# Patient Record
Sex: FEMALE | Race: BLACK OR AFRICAN AMERICAN | Employment: PART TIME | URBAN - METROPOLITAN AREA
[De-identification: names, ages, dates, MRNs, and addresses within clinical notes are randomized per-mention and may not be internally consistent; named-entity substitution may affect disease eponyms.]

---

## 2017-08-02 ENCOUNTER — HOSPITAL ENCOUNTER (EMERGENCY)
Age: 27
Discharge: HOME | End: 2017-08-03
Attending: INTERNAL MEDICINE
Payer: SELF-PAY

## 2017-08-02 VITALS
TEMPERATURE: 98.4 F | HEIGHT: 62 IN | WEIGHT: 155 LBS | SYSTOLIC BLOOD PRESSURE: 131 MMHG | RESPIRATION RATE: 20 BRPM | HEART RATE: 89 BPM | OXYGEN SATURATION: 100 % | BODY MASS INDEX: 28.52 KG/M2 | DIASTOLIC BLOOD PRESSURE: 98 MMHG

## 2017-08-02 DIAGNOSIS — K29.50 CHRONIC GASTRITIS WITHOUT BLEEDING, UNSPECIFIED GASTRITIS TYPE: Primary | ICD-10-CM

## 2017-08-02 DIAGNOSIS — R11.2 NON-INTRACTABLE VOMITING WITH NAUSEA, UNSPECIFIED VOMITING TYPE: ICD-10-CM

## 2017-08-02 LAB
ANION GAP BLD CALC-SCNC: 13 MMOL/L (ref 3–18)
APPEARANCE UR: ABNORMAL
BACTERIA URNS QL MICRO: ABNORMAL /HPF
BASOPHILS # BLD AUTO: 0 K/UL (ref 0–0.06)
BASOPHILS # BLD: 0 % (ref 0–2)
BILIRUB UR QL: NEGATIVE
BUN SERPL-MCNC: 14 MG/DL (ref 7–18)
BUN/CREAT SERPL: 15 (ref 12–20)
CALCIUM SERPL-MCNC: 8.8 MG/DL (ref 8.5–10.1)
CHLORIDE SERPL-SCNC: 106 MMOL/L (ref 100–108)
CO2 SERPL-SCNC: 25 MMOL/L (ref 21–32)
COLOR UR: YELLOW
CREAT SERPL-MCNC: 0.91 MG/DL (ref 0.6–1.3)
DIFFERENTIAL METHOD BLD: NORMAL
EOSINOPHIL # BLD: 0 K/UL (ref 0–0.4)
EOSINOPHIL NFR BLD: 0 % (ref 0–5)
EPITH CASTS URNS QL MICRO: ABNORMAL /LPF (ref 0–5)
ERYTHROCYTE [DISTWIDTH] IN BLOOD BY AUTOMATED COUNT: 12.4 % (ref 11.6–14.5)
GLUCOSE SERPL-MCNC: 91 MG/DL (ref 74–99)
GLUCOSE UR STRIP.AUTO-MCNC: NEGATIVE MG/DL
HCG UR QL: NEGATIVE
HCT VFR BLD AUTO: 38.1 % (ref 35–45)
HGB BLD-MCNC: 13.2 G/DL (ref 12–16)
HGB UR QL STRIP: ABNORMAL
KETONES UR QL STRIP.AUTO: 15 MG/DL
LEUKOCYTE ESTERASE UR QL STRIP.AUTO: NEGATIVE
LYMPHOCYTES # BLD AUTO: 24 % (ref 21–52)
LYMPHOCYTES # BLD: 2.5 K/UL (ref 0.9–3.6)
MCH RBC QN AUTO: 29.7 PG (ref 24–34)
MCHC RBC AUTO-ENTMCNC: 34.6 G/DL (ref 31–37)
MCV RBC AUTO: 85.6 FL (ref 74–97)
MONOCYTES # BLD: 0.8 K/UL (ref 0.05–1.2)
MONOCYTES NFR BLD AUTO: 8 % (ref 3–10)
MUCOUS THREADS URNS QL MICRO: ABNORMAL /LPF
NEUTS SEG # BLD: 6.9 K/UL (ref 1.8–8)
NEUTS SEG NFR BLD AUTO: 68 % (ref 40–73)
NITRITE UR QL STRIP.AUTO: NEGATIVE
PH UR STRIP: 5.5 [PH] (ref 5–8)
PLATELET # BLD AUTO: 200 K/UL (ref 135–420)
PMV BLD AUTO: 10 FL (ref 9.2–11.8)
POTASSIUM SERPL-SCNC: 3.6 MMOL/L (ref 3.5–5.5)
PROT UR STRIP-MCNC: 30 MG/DL
RBC # BLD AUTO: 4.45 M/UL (ref 4.2–5.3)
RBC #/AREA URNS HPF: ABNORMAL /HPF (ref 0–5)
SODIUM SERPL-SCNC: 144 MMOL/L (ref 136–145)
SP GR UR REFRACTOMETRY: >1.03 (ref 1–1.03)
UROBILINOGEN UR QL STRIP.AUTO: 1 EU/DL (ref 0.2–1)
WBC # BLD AUTO: 10.3 K/UL (ref 4.6–13.2)
WBC URNS QL MICRO: ABNORMAL /HPF (ref 0–5)

## 2017-08-02 PROCEDURE — 96374 THER/PROPH/DIAG INJ IV PUSH: CPT

## 2017-08-02 PROCEDURE — 74011250636 HC RX REV CODE- 250/636: Performed by: PHYSICIAN ASSISTANT

## 2017-08-02 PROCEDURE — 80048 BASIC METABOLIC PNL TOTAL CA: CPT | Performed by: PHYSICIAN ASSISTANT

## 2017-08-02 PROCEDURE — 99283 EMERGENCY DEPT VISIT LOW MDM: CPT

## 2017-08-02 PROCEDURE — 81001 URINALYSIS AUTO W/SCOPE: CPT | Performed by: INTERNAL MEDICINE

## 2017-08-02 PROCEDURE — 81025 URINE PREGNANCY TEST: CPT

## 2017-08-02 PROCEDURE — 85025 COMPLETE CBC W/AUTO DIFF WBC: CPT | Performed by: PHYSICIAN ASSISTANT

## 2017-08-02 PROCEDURE — 96361 HYDRATE IV INFUSION ADD-ON: CPT

## 2017-08-02 RX ORDER — ONDANSETRON 2 MG/ML
4 INJECTION INTRAMUSCULAR; INTRAVENOUS
Status: COMPLETED | OUTPATIENT
Start: 2017-08-02 | End: 2017-08-02

## 2017-08-02 RX ADMIN — SODIUM CHLORIDE 1000 ML: 900 INJECTION, SOLUTION INTRAVENOUS at 23:39

## 2017-08-02 RX ADMIN — ONDANSETRON 4 MG: 2 INJECTION INTRAMUSCULAR; INTRAVENOUS at 23:39

## 2017-08-03 RX ORDER — OMEPRAZOLE 20 MG/1
20 CAPSULE, DELAYED RELEASE ORAL DAILY
Qty: 30 CAP | Refills: 0 | Status: SHIPPED | OUTPATIENT
Start: 2017-08-03 | End: 2017-09-02

## 2017-08-03 RX ORDER — ONDANSETRON 4 MG/1
4 TABLET, ORALLY DISINTEGRATING ORAL
Qty: 12 TAB | Refills: 0 | Status: SHIPPED | OUTPATIENT
Start: 2017-08-03 | End: 2019-01-16

## 2017-08-03 NOTE — ED NOTES
I have reviewed discharge instructions with the patient. The patient verbalized understanding.   Patient armband removed and shredded  Reviewed and verified discharge instructions with patient, prescriptions e-scripted to [facility] for Zofran and Prilosec, pt discharged ambulatory on steady gait in NAD

## 2017-08-03 NOTE — ED TRIAGE NOTES
Pt c/o dizziness and vomiting since 0200 yesterday morning. Sepsis Screening completed    (  )Patient meets SIRS criteria. ( X )Patient does not meet SIRS criteria.       SIRS Criteria is achieved when two or more of the following are present   Temperature < 96.8°F (36°C) or > 100.9°F (38.3°C)   Heart Rate > 90 beats per minute   Respiratory Rate > 20 breaths per minute   WBC count > 12,000 or <4,000 or > 10% bands

## 2017-08-03 NOTE — ED NOTES
Pt reports intermittent vomiting for \"a long time\", is unable to verbalize anything that makes it worse.

## 2017-08-03 NOTE — ED PROVIDER NOTES
[address]  EMERGENCY DEPARTMENT HISTORY AND PHYSICAL EXAM       Date: 8/2/2017   Patient Name: Jenniffer Marquez   YOB: 1990  Medical Record Number: [id number]    History of Presenting Illness     Chief Complaint   Patient presents with    Dizziness    Vomiting        History Provided By:  patient    Additional History: 10:17 PM   Jenniffer Marquez is a 33 y.o. female who presents to the emergency department C/O lightheadedness and dizziness starting yesterday. Associated sxs include headache, lower back pain, fatigue, and intermittent, cramping, abdominal pain. Pt reports she has chronic nausea and vomiting for \"years\"; vomiting occurs approximately 5 days a week. States that she has been evaluated for this in the past, but is only diagnosed with dehydration. Denies prior evaluation with endoscopy or colonoscopy. Denies frequent NSAID use. Pt states she now has daily BMs, which is more frequent than her typical. Reports fhx of GERD. Reports cigarette use and occasional etoh use; denies illicit drug use. Denies fever, diarrhea, hematochezia, and any other sxs or complaints. Primary Care Provider: Adelaida Mojica MD   Specialist:    Past History     Past Medical History:   History reviewed. No pertinent past medical history. Past Surgical History:   Past Surgical History:   Procedure Laterality Date    HX ORTHOPAEDIC      right foot surgery        Family History:   History reviewed. No pertinent family history. Social History:   Social History   Substance Use Topics    Smoking status: Current Every Day Smoker     Packs/day: 0.25    Smokeless tobacco: Never Used    Alcohol use Yes      Comment: occasional        Allergies:   No Known Allergies     Review of Systems   Review of Systems   Constitutional: Positive for fatigue. Negative for fever. Gastrointestinal: Positive for abdominal pain (intermittent), nausea (chornic) and vomiting (chronic).  Negative for blood in stool and diarrhea. Musculoskeletal: Positive for back pain (lower). Neurological: Positive for dizziness and light-headedness. All other systems reviewed and are negative. Physical Exam  Vitals:    08/02/17 2147   BP: (!) 131/98   Pulse: 89   Resp: 20   Temp: 98.4 °F (36.9 °C)   SpO2: 100%   Weight: 70.3 kg (155 lb)   Height: 5' 2\" (1.575 m)       Physical Exam   Nursing note and vitals reviewed. Vital signs and nursing notes reviewed. CONSTITUTIONAL: Alert. Well-appearing; well-nourished; in no apparent distress. HEAD: Normocephalic; atraumatic. EYES: PERRL; Conjunctiva clear. ENT: TM's normal. Normal nose; no rhinorrhea. Normal pharynx. Tonsils not enlarged without exudate. Slightly dry mucus membranes. NECK: Supple; FROM without difficulty, non-tender; no cervical lymphadenopathy. CV: Normal S1, S2; no murmurs, rubs, or gallops. No chest wall tenderness. RESPIRATORY: Normal chest excursion with respiration; breath sounds clear and equal bilaterally; no wheezes, rhonchi, or rales. GI: Normal bowel sounds; non-distended; non-tender; no guarding or rigidity; no palpable organomegaly. No CVA tenderness. BACK:  No evidence of trauma or deformity. Non-tender to palpation. FROM without difficulty. Negative straight leg raise bilaterally. EXT: Normal ROM in all four extremities; non-tender to palpation. SKIN: Normal for age and race; warm; dry; good turgor; no apparent lesions or exudate. NEURO: A & O x3. Cranial nerves 2-12 intact. Motor 5/5 bilaterally. Sensation intact. PSYCH:  Mood and affect appropriate.       Diagnostic Study Results     Labs -      Recent Results (from the past 12 hour(s))   URINALYSIS W/ RFLX MICROSCOPIC    Collection Time: 08/02/17  9:57 PM   Result Value Ref Range    Color YELLOW      Appearance CLOUDY      Specific gravity >1.030 (H) 1.005 - 1.030    pH (UA) 5.5 5.0 - 8.0      Protein 30 (A) NEG mg/dL    Glucose NEGATIVE  NEG mg/dL    Ketone 15 (A) NEG mg/dL    Bilirubin NEGATIVE  NEG      Blood TRACE (A) NEG      Urobilinogen 1.0 0.2 - 1.0 EU/dL    Nitrites NEGATIVE  NEG      Leukocyte Esterase NEGATIVE  NEG     URINE MICROSCOPIC ONLY    Collection Time: 08/02/17  9:57 PM   Result Value Ref Range    WBC 0 to 1 0 - 5 /hpf    RBC 1 to 3 0 - 5 /hpf    Epithelial cells 2+ 0 - 5 /lpf    Bacteria 1+ (A) NEG /hpf    Mucus 3+ (A) NEG /lpf   HCG URINE, QL. - POC    Collection Time: 08/02/17 10:27 PM   Result Value Ref Range    Pregnancy test,urine (POC) NEGATIVE  NEG     CBC WITH AUTOMATED DIFF    Collection Time: 08/02/17 11:25 PM   Result Value Ref Range    WBC 10.3 4.6 - 13.2 K/uL    RBC 4.45 4.20 - 5.30 M/uL    HGB 13.2 12.0 - 16.0 g/dL    HCT 38.1 35.0 - 45.0 %    MCV 85.6 74.0 - 97.0 FL    MCH 29.7 24.0 - 34.0 PG    MCHC 34.6 31.0 - 37.0 g/dL    RDW 12.4 11.6 - 14.5 %    PLATELET 329 517 - 697 K/uL    MPV 10.0 9.2 - 11.8 FL    NEUTROPHILS 68 40 - 73 %    LYMPHOCYTES 24 21 - 52 %    MONOCYTES 8 3 - 10 %    EOSINOPHILS 0 0 - 5 %    BASOPHILS 0 0 - 2 %    ABS. NEUTROPHILS 6.9 1.8 - 8.0 K/UL    ABS. LYMPHOCYTES 2.5 0.9 - 3.6 K/UL    ABS. MONOCYTES 0.8 0.05 - 1.2 K/UL    ABS. EOSINOPHILS 0.0 0.0 - 0.4 K/UL    ABS. BASOPHILS 0.0 0.0 - 0.06 K/UL    DF AUTOMATED     METABOLIC PANEL, BASIC    Collection Time: 08/02/17 11:25 PM   Result Value Ref Range    Sodium 144 136 - 145 mmol/L    Potassium 3.6 3.5 - 5.5 mmol/L    Chloride 106 100 - 108 mmol/L    CO2 25 21 - 32 mmol/L    Anion gap 13 3.0 - 18 mmol/L    Glucose 91 74 - 99 mg/dL    BUN 14 7.0 - 18 MG/DL    Creatinine 0.91 0.6 - 1.3 MG/DL    BUN/Creatinine ratio 15 12 - 20      GFR est AA >60 >60 ml/min/1.73m2    GFR est non-AA >60 >60 ml/min/1.73m2    Calcium 8.8 8.5 - 10.1 MG/DL       Radiologic Studies -  The following have been ordered and reviewed:  No orders to display       Medical Decision Making   I am the first provider for this patient.      I reviewed the vital signs, available nursing notes, past medical history, past surgical history, family history and social history. Vital Signs-Reviewed the patient's vital signs. Patient Vitals for the past 12 hrs:   Temp Pulse Resp BP SpO2   08/02/17 2147 98.4 °F (36.9 °C) 89 20 (!) 131/98 100 %       Pulse Oximetry Analysis - Normal 100% on room air     Procedures:   Procedures    ED Course:  10:17 PM  Initial assessment performed. The patients presenting problems have been discussed, and they are in agreement with the care plan formulated and outlined with them. I have encouraged them to ask questions as they arise throughout their visit. Medications Given in the ED:  Medications   sodium chloride 0.9 % bolus infusion 1,000 mL (0 mL IntraVENous IV Completed 8/3/17 0042)   ondansetron (ZOFRAN) injection 4 mg (4 mg IntraVENous Given 8/2/17 4583)       Discharge Note:  12:15 AM   Pt has been reexamined. Patient has no new complaints, changes, or physical findings. Care plan outlined and precautions discussed. Results were reviewed with the patient. All medications were reviewed with the patient; will d/c home with Zofran and Prilosec. All of pt's questions and concerns were addressed. Patient was instructed and agrees to follow up with her PCP, as well as to return to the ED upon further deterioration. Patient is ready to go home. Diagnosis   Clinical Impression:   1. Chronic gastritis without bleeding, unspecified gastritis type    2.  Non-intractable vomiting with nausea, unspecified vomiting type         Follow-up Information     Follow up With Details Comments [address] Schedule an appointment as soon as possible for a visit in 2 days For primary care follow up [address] EMERGENCY DEPT  As needed, If symptoms worsen [address] [address]  [phone]          Discharge Medication List as of 8/3/2017 12:15 AM      START taking these medications    Details   ondansetron (ZOFRAN ODT) 4 mg disintegrating tablet Take 1 Tab by mouth every eight (8) hours as needed for Nausea., Normal, Disp-12 Tab, R-0      omeprazole (PRILOSEC) 20 mg capsule Take 1 Cap by mouth daily for 30 days. , Normal, Disp-30 Cap, R-0             _______________________________   Attestations: This note is prepared by Sammy Mitchell, acting as a Scribe for [employer] on 10:15 PM on 8/2/2017 . [employer]FR[employer]O: The scribe's documentation has been prepared under my direction and personally reviewed by me in its entirety.   _______________________________

## 2017-08-03 NOTE — DISCHARGE INSTRUCTIONS
Gastritis: Care Instructions  Your Care Instructions    Gastritis is a sore and upset stomach. It happens when something irritates the stomach lining. Many things can cause it. These include an infection such as the flu or something you ate or drank. Medicines or a sore on the lining of the stomach (ulcer) also can cause it. Your belly may bloat and ache. You may belch, vomit, and feel sick to your stomach. You should be able to relieve the problem by taking medicine. And it may help to change your diet. If gastritis lasts, your doctor may prescribe medicine. Follow-up care is a key part of your treatment and safety. Be sure to make and go to all appointments, and call your doctor if you are having problems. It's also a good idea to know your test results and keep a list of the medicines you take. How can you care for yourself at home? · If your doctor prescribed antibiotics, take them as directed. Do not stop taking them just because you feel better. You need to take the full course of antibiotics. · Be safe with medicines. If your doctor prescribed medicine to decrease stomach acid, take it as directed. Call your doctor if you think you are having a problem with your medicine. · Do not take any other medicine, including over-the-counter pain relievers, without talking to your doctor first.  · If your doctor recommends over-the-counter medicine to reduce stomach acid, such as Pepcid AC, Prilosec, Tagamet HB, or Zantac 75, follow the directions on the label. · Drink plenty of fluids (enough so that your urine is light yellow or clear like water) to prevent dehydration. Choose water and other caffeine-free clear liquids. If you have kidney, heart, or liver disease and have to limit fluids, talk with your doctor before you increase the amount of fluids you drink. · Limit how much alcohol you drink. · Avoid coffee, tea, cola drinks, chocolate, and other foods with caffeine.  They increase stomach acid.  When should you call for help? Call 911 anytime you think you may need emergency care. For example, call if:  · You vomit blood or what looks like coffee grounds. · You pass maroon or very bloody stools. Call your doctor now or seek immediate medical care if:  · You start breathing fast and have not produced urine in the last 8 hours. · You cannot keep fluids down. Watch closely for changes in your health, and be sure to contact your doctor if:  · You do not get better as expected. Where can you learn more? Go to http://elin-emely.info/. Enter [id number] in the search box to learn more about \"Gastritis: Care Instructions. \"  Current as of: August 9, 2016  Content Version: 11.3  © 5103-1276 [employer]. Care instructions adapted under license by [employer] (which disclaims liability or warranty for this information). If you have questions about a medical condition or this instruction, always ask your healthcare professional. [place] [address] any warranty or liability for your use of this information. Nausea and Vomiting: Care Instructions  Your Care Instructions    When you are nauseated, you may feel weak and sweaty and notice a lot of saliva in your mouth. Nausea often leads to vomiting. Most of the time you do not need to worry about nausea and vomiting, but they can be signs of other illnesses. Two common causes of nausea and vomiting are stomach flu and food poisoning. Nausea and vomiting from viral stomach flu will usually start to improve within 24 hours. Nausea and vomiting from food poisoning may last from 12 to 48 hours. The doctor has checked you carefully, but problems can develop later. If you notice any problems or new symptoms, get medical treatment right away. Follow-up care is a key part of your treatment and safety. Be sure to make and go to all appointments, and call your doctor if you are having problems.  It's also a good idea to know your test results and keep a list of the medicines you take. How can you care for yourself at home? · To prevent dehydration, drink plenty of fluids, enough so that your urine is light yellow or clear like water. Choose water and other caffeine-free clear liquids until you feel better. If you have kidney, heart, or liver disease and have to limit fluids, talk with your doctor before you increase the amount of fluids you drink. · Rest in bed until you feel better. · When you are able to eat, try clear soups, mild foods, and liquids until all symptoms are gone for 12 to 48 hours. Other good choices include dry toast, crackers, cooked cereal, and gelatin dessert, such as Jell-O. When should you call for help? Call 911 anytime you think you may need emergency care. For example, call if:  · You passed out (lost consciousness). Call your doctor now or seek immediate medical care if:  · You have symptoms of dehydration, such as:  ¨ Dry eyes and a dry mouth. ¨ Passing only a little dark urine. ¨ Feeling thirstier than usual.  · You have new or worsening belly pain. · You have a new or higher fever. · You vomit blood or what looks like coffee grounds. Watch closely for changes in your health, and be sure to contact your doctor if:  · You have ongoing nausea and vomiting. · Your vomiting is getting worse. · Your vomiting lasts longer than 2 days. · You are not getting better as expected. Where can you learn more? Go to http://elin-emely.info/. Enter 25 [id number] in the search box to learn more about \"Nausea and Vomiting: Care Instructions. \"  Current as of: March 20, 2017  Content Version: 11.3  © 1969-9683 [employer]. Care instructions adapted under license by [employer] (which disclaims liability or warranty for this information).  If you have questions about a medical condition or this instruction, always ask your healthcare professional. [employer] disclaims any warranty or liability for your use of this information.

## 2019-01-16 ENCOUNTER — APPOINTMENT (OUTPATIENT)
Dept: CT IMAGING | Age: 29
End: 2019-01-16
Attending: PHYSICIAN ASSISTANT
Payer: COMMERCIAL

## 2019-01-16 ENCOUNTER — APPOINTMENT (OUTPATIENT)
Dept: GENERAL RADIOLOGY | Age: 29
End: 2019-01-16
Attending: EMERGENCY MEDICINE
Payer: COMMERCIAL

## 2019-01-16 ENCOUNTER — APPOINTMENT (OUTPATIENT)
Dept: GENERAL RADIOLOGY | Age: 29
End: 2019-01-16
Attending: PHYSICIAN ASSISTANT
Payer: COMMERCIAL

## 2019-01-16 ENCOUNTER — HOSPITAL ENCOUNTER (EMERGENCY)
Age: 29
Discharge: HOME | End: 2019-01-16
Attending: EMERGENCY MEDICINE
Payer: COMMERCIAL

## 2019-01-16 VITALS
WEIGHT: 192.24 LBS | DIASTOLIC BLOOD PRESSURE: 73 MMHG | HEIGHT: 62 IN | OXYGEN SATURATION: 100 % | SYSTOLIC BLOOD PRESSURE: 122 MMHG | BODY MASS INDEX: 35.38 KG/M2 | HEART RATE: 89 BPM | TEMPERATURE: 98 F | RESPIRATION RATE: 24 BRPM

## 2019-01-16 DIAGNOSIS — S43.005A DISLOCATION OF LEFT SHOULDER JOINT, INITIAL ENCOUNTER: Primary | ICD-10-CM

## 2019-01-16 LAB
ANION GAP SERPL CALC-SCNC: 9 MMOL/L (ref 3–18)
BASOPHILS # BLD: 0 K/UL (ref 0–0.1)
BASOPHILS NFR BLD: 0 % (ref 0–2)
BUN SERPL-MCNC: 12 MG/DL (ref 7–18)
BUN/CREAT SERPL: 12 (ref 12–20)
CALCIUM SERPL-MCNC: 8.7 MG/DL (ref 8.5–10.1)
CHLORIDE SERPL-SCNC: 109 MMOL/L (ref 100–108)
CO2 SERPL-SCNC: 24 MMOL/L (ref 21–32)
CREAT SERPL-MCNC: 1.01 MG/DL (ref 0.6–1.3)
DIFFERENTIAL METHOD BLD: ABNORMAL
EOSINOPHIL # BLD: 0.1 K/UL (ref 0–0.4)
EOSINOPHIL NFR BLD: 0 % (ref 0–5)
ERYTHROCYTE [DISTWIDTH] IN BLOOD BY AUTOMATED COUNT: 15.1 % (ref 11.6–14.5)
GLUCOSE SERPL-MCNC: 120 MG/DL (ref 74–99)
HCT VFR BLD AUTO: 37.3 % (ref 35–45)
HGB BLD-MCNC: 11.6 G/DL (ref 12–16)
LYMPHOCYTES # BLD: 2 K/UL (ref 0.9–3.6)
LYMPHOCYTES NFR BLD: 16 % (ref 21–52)
MCH RBC QN AUTO: 26 PG (ref 24–34)
MCHC RBC AUTO-ENTMCNC: 31.1 G/DL (ref 31–37)
MCV RBC AUTO: 83.6 FL (ref 74–97)
MONOCYTES # BLD: 0.3 K/UL (ref 0.05–1.2)
MONOCYTES NFR BLD: 3 % (ref 3–10)
NEUTS SEG # BLD: 9.9 K/UL (ref 1.8–8)
NEUTS SEG NFR BLD: 81 % (ref 40–73)
PLATELET # BLD AUTO: 282 K/UL (ref 135–420)
PMV BLD AUTO: 10.5 FL (ref 9.2–11.8)
POTASSIUM SERPL-SCNC: 3.6 MMOL/L (ref 3.5–5.5)
RBC # BLD AUTO: 4.46 M/UL (ref 4.2–5.3)
SODIUM SERPL-SCNC: 142 MMOL/L (ref 136–145)
WBC # BLD AUTO: 12.3 K/UL (ref 4.6–13.2)

## 2019-01-16 PROCEDURE — 73020 X-RAY EXAM OF SHOULDER: CPT

## 2019-01-16 PROCEDURE — 74011250636 HC RX REV CODE- 250/636

## 2019-01-16 PROCEDURE — 74011000250 HC RX REV CODE- 250: Performed by: PHYSICIAN ASSISTANT

## 2019-01-16 PROCEDURE — 74011250637 HC RX REV CODE- 250/637: Performed by: PHYSICIAN ASSISTANT

## 2019-01-16 PROCEDURE — 73206 CT ANGIO UPR EXTRM W/O&W/DYE: CPT

## 2019-01-16 PROCEDURE — 99152 MOD SED SAME PHYS/QHP 5/>YRS: CPT

## 2019-01-16 PROCEDURE — 85025 COMPLETE CBC W/AUTO DIFF WBC: CPT

## 2019-01-16 PROCEDURE — 96374 THER/PROPH/DIAG INJ IV PUSH: CPT

## 2019-01-16 PROCEDURE — 80048 BASIC METABOLIC PNL TOTAL CA: CPT

## 2019-01-16 PROCEDURE — 74011250636 HC RX REV CODE- 250/636: Performed by: PHYSICIAN ASSISTANT

## 2019-01-16 PROCEDURE — 99285 EMERGENCY DEPT VISIT HI MDM: CPT

## 2019-01-16 PROCEDURE — 75810000301 HC ER LEVEL 1 CLOSED TREATMNT FRACTURE/DISLOCATION

## 2019-01-16 PROCEDURE — 73030 X-RAY EXAM OF SHOULDER: CPT

## 2019-01-16 PROCEDURE — 99153 MOD SED SAME PHYS/QHP EA: CPT

## 2019-01-16 PROCEDURE — 74011636320 HC RX REV CODE- 636/320: Performed by: EMERGENCY MEDICINE

## 2019-01-16 RX ORDER — OXYCODONE AND ACETAMINOPHEN 5; 325 MG/1; MG/1
1 TABLET ORAL
Qty: 10 TAB | Refills: 0 | Status: SHIPPED | OUTPATIENT
Start: 2019-01-16 | End: 2019-03-23

## 2019-01-16 RX ORDER — KETAMINE HYDROCHLORIDE 50 MG/ML
200 INJECTION, SOLUTION INTRAMUSCULAR; INTRAVENOUS
Status: COMPLETED | OUTPATIENT
Start: 2019-01-16 | End: 2019-01-16

## 2019-01-16 RX ORDER — ONDANSETRON 2 MG/ML
4 INJECTION INTRAMUSCULAR; INTRAVENOUS
Status: COMPLETED | OUTPATIENT
Start: 2019-01-16 | End: 2019-01-16

## 2019-01-16 RX ORDER — ONDANSETRON 4 MG/1
4 TABLET, ORALLY DISINTEGRATING ORAL
Status: COMPLETED | OUTPATIENT
Start: 2019-01-16 | End: 2019-01-16

## 2019-01-16 RX ORDER — ONDANSETRON 2 MG/ML
INJECTION INTRAMUSCULAR; INTRAVENOUS
Status: COMPLETED
Start: 2019-01-16 | End: 2019-01-16

## 2019-01-16 RX ORDER — FENTANYL CITRATE 50 UG/ML
100 INJECTION, SOLUTION INTRAMUSCULAR; INTRAVENOUS ONCE
Status: COMPLETED | OUTPATIENT
Start: 2019-01-16 | End: 2019-01-16

## 2019-01-16 RX ORDER — HYDROCODONE BITARTRATE AND ACETAMINOPHEN 5; 325 MG/1; MG/1
1 TABLET ORAL
Status: COMPLETED | OUTPATIENT
Start: 2019-01-16 | End: 2019-01-16

## 2019-01-16 RX ADMIN — IOPAMIDOL 100 ML: 755 INJECTION, SOLUTION INTRAVENOUS at 19:20

## 2019-01-16 RX ADMIN — HYDROCODONE BITARTRATE AND ACETAMINOPHEN 1 TABLET: 5; 325 TABLET ORAL at 20:10

## 2019-01-16 RX ADMIN — FENTANYL CITRATE 100 MCG: 50 INJECTION, SOLUTION INTRAMUSCULAR; INTRAVENOUS at 17:55

## 2019-01-16 RX ADMIN — KETAMINE HYDROCHLORIDE 100 MG: 50 INJECTION, SOLUTION INTRAMUSCULAR; INTRAVENOUS at 18:07

## 2019-01-16 RX ADMIN — ONDANSETRON 4 MG: 2 INJECTION INTRAMUSCULAR; INTRAVENOUS at 18:58

## 2019-01-16 RX ADMIN — ONDANSETRON 4 MG: 4 TABLET, ORALLY DISINTEGRATING ORAL at 17:55

## 2019-01-16 NOTE — ED NOTES
1807: 100mg Ketamine given by Dr. Macarena Olivares: Shoulder reduced 1810: pulse verified with doppler on Left radial.

## 2019-01-16 NOTE — LETTER
[facility] [place] 
[facility] 
[address] [id number] [phone] Work/School Note Date: 1/16/2019 To Whom It May concern: 
 
Mary Ellen Bailey was seen and treated today in the emergency room by the following provider(s): 
Attending Provider: Clarisse Gomez MD 
Physician Assistant: MARILIA Nath. Mary Ellen Bailey may return to work on 1/18/2019. Special Instructions:  No use of left arm until cleared by ortho. Sincerely, Rishi Cordova PA-C
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
## 2019-01-17 NOTE — ED NOTES
Pt returned from CT via stretcher at this time no acute distress noted pt placed back on cardiac monitor.

## 2019-01-17 NOTE — ED NOTES
I have reviewed discharge instructions with the patient. The patient verbalized understanding. Discharge medications reviewed with patient and appropriate educational materials and side effects teaching were provided. Patient armband removed and shredded. Pt signed paper discharge instructions pt removed all belongings and ambulated without distress or discomfort.

## 2019-01-17 NOTE — DISCHARGE INSTRUCTIONS
Patient Education        Dislocated Shoulder: Care Instructions  Your Care Instructions    When the upper arm comes out of the shoulder socket, it is called a dislocated shoulder. After the doctor puts the shoulder back in place, he or she may put your arm in a sling or shoulder immobilizer. This will keep it from moving. Exercise and physical therapy can help your shoulder get strong and move normally again. It may take up to a year for your shoulder to heal and be free of pain. If your shoulder keeps coming out of place, talk to your doctor about surgery. It can prevent dislocations. You may have had a sedative to help you relax. You may be unsteady after having sedation. It can take a few hours for the medicine's effects to wear off. Common side effects of sedation include nausea, vomiting, and feeling sleepy or tired. The doctor has checked you carefully, but problems can develop later. If you notice any problems or new symptoms, get medical treatment right away. Follow-up care is a key part of your treatment and safety. Be sure to make and go to all appointments, and call your doctor if you are having problems. It's also a good idea to know your test results and keep a list of the medicines you take. How can you care for yourself at home? · If the doctor gave you a sedative:  ? For 24 hours, don't do anything that requires attention to detail. It takes time for the medicine's effects to completely wear off.  ? For your safety, do not drive or operate any machinery that could be dangerous. Wait until the medicine wears off and you can think clearly and react easily. · If your doctor put your arm in a sling or shoulder immobilizer, wear it as directed. · Take pain medicines exactly as directed. ? If the doctor gave you a prescription medicine for pain, take it as prescribed. ? If you are not taking a prescription pain medicine, ask your doctor if you can take an over-the-counter medicine.   · Put ice or a cold pack on your shoulder for 10 to 20 minutes at a time. Try to do this every 1 to 2 hours for the next 3 days (when you are awake). Put a thin cloth between the ice and your skin. · You may use warm packs after the first 3 days for 15 to 20 minutes at a time. This can ease pain. · If your doctor gave you exercises to do at home, do them exactly as your doctor told you. · Do not do anything that makes the pain worse. When should you call for help? Call 911 anytime you think you may need emergency care. For example, call if:    · You have trouble breathing.     · You passed out (lost consciousness).    Call your doctor now or seek immediate medical care if:    · You have new or worse nausea or vomiting.     · You have new or worse pain.     · Your hand or fingers are cool or pale or change color.     · You have tingling, weakness, or numbness in your hand or fingers.    Watch closely for changes in your health, and be sure to contact your doctor if:    · You do not get better as expected. Where can you learn more? Go to http://elin-emely.info/. Enter D219 in the search box to learn more about \"Dislocated Shoulder: Care Instructions. \"  Current as of: November 29, 2017  Content Version: 11.8  © 0234-4905 [employer]. Care instructions adapted under license by [employer] (which disclaims liability or warranty for this information). If you have questions about a medical condition or this instruction, always ask your healthcare professional. [place] [address] any warranty or liability for your use of this information.

## 2019-03-23 ENCOUNTER — HOSPITAL ENCOUNTER (EMERGENCY)
Age: 29
Discharge: HOME | End: 2019-03-24
Attending: EMERGENCY MEDICINE | Admitting: EMERGENCY MEDICINE
Payer: SELF-PAY

## 2019-03-23 ENCOUNTER — APPOINTMENT (OUTPATIENT)
Dept: GENERAL RADIOLOGY | Age: 29
End: 2019-03-23
Attending: EMERGENCY MEDICINE
Payer: SELF-PAY

## 2019-03-23 VITALS
DIASTOLIC BLOOD PRESSURE: 64 MMHG | SYSTOLIC BLOOD PRESSURE: 130 MMHG | TEMPERATURE: 98.5 F | HEIGHT: 62 IN | WEIGHT: 180 LBS | HEART RATE: 87 BPM | BODY MASS INDEX: 33.13 KG/M2 | OXYGEN SATURATION: 100 % | RESPIRATION RATE: 18 BRPM

## 2019-03-23 DIAGNOSIS — S86.912A KNEE STRAIN, LEFT, INITIAL ENCOUNTER: Primary | ICD-10-CM

## 2019-03-23 PROCEDURE — 99283 EMERGENCY DEPT VISIT LOW MDM: CPT

## 2019-03-23 PROCEDURE — 73564 X-RAY EXAM KNEE 4 OR MORE: CPT

## 2019-03-24 PROCEDURE — L1830 KO IMMOB CANVAS LONG PRE OTS: HCPCS

## 2019-03-24 RX ORDER — TRAMADOL HYDROCHLORIDE 50 MG/1
50 TABLET ORAL
Qty: 20 TAB | Refills: 0 | Status: SHIPPED | OUTPATIENT
Start: 2019-03-24 | End: 2019-03-27

## 2019-03-24 RX ORDER — TRAMADOL HYDROCHLORIDE 50 MG/1
50 TABLET ORAL
Status: DISCONTINUED | OUTPATIENT
Start: 2019-03-24 | End: 2019-03-24

## 2019-03-24 NOTE — ED TRIAGE NOTES
Pt states \" I had a dance competition today and I felt my left knee cap popped out and went back in. It feels like it keeps going in and out. \"

## 2019-03-24 NOTE — ED PROVIDER NOTES
EMERGENCY DEPARTMENT HISTORY AND PHYSICAL EXAM 
 
Date: 3/23/2019 Patient Name: Reji Kovacs History of Presenting Illness Chief Complaint Patient presents with  Knee Injury History Provided By: Patient Reji Kovacs is a 33 y.o. female with no PMHX who presents to the emergency department C/O left knee pain. 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 She states she is able to ambulate but with increased pain to the lateral aspect of her left knee. pt denies ankle pain hip pain inability to ambulate history of previous injury injury anywhere else, and any other sxs or complaints. PCP: Adelaida Mojica MD 
 
Current Facility-Administered Medications Medication Dose Route Frequency Provider Last Rate Last Dose  traMADol (ULTRAM) tablet 50 mg  50 mg Oral NOW Rosa Elena Andrews PA Past History Past Medical History: 
Past Medical History:  
Diagnosis Date  Dislocated shoulder, left, initial encounter Past Surgical History: 
Past Surgical History:  
Procedure Laterality Date  HX GYN Mirena inserted [id number]  HX ORTHOPAEDIC    
 right foot surgery Family History: 
History reviewed. No pertinent family history. Social History: 
Social History Tobacco Use  Smoking status: Current Every Day Smoker Packs/day: 0.25  Smokeless tobacco: Never Used Substance Use Topics  Alcohol use: Yes Comment: occasional  
 Drug use: No  
 
 
Allergies: Allergies Allergen Reactions  Naproxen Other (comments) Patient reports constipation Review of Systems Review of Systems Musculoskeletal: Positive for arthralgias (left knee). Negative for back pain, gait problem and joint swelling. Skin: Negative for wound. All other systems reviewed and are negative. Physical Exam  
 
Vitals:  
 03/23/19 2208 BP: 130/64 Pulse: 87 Resp: 18 Temp: 98.5 °F (36.9 °C) SpO2: 100% Weight: 81.6 kg (180 lb) Height: 5' 2\" (1.575 m) Physical Exam 
 
 
Diagnostic Study Results Labs - No results found for this or any previous visit (from the past 12 hour(s)). Radiologic Studies -  
XR KNEE LT MIN 4 V    (Results Pending) Left knee x-ray shows no acute process pending review by radiologist 
 
 
CT Results  (Last 48 hours) None CXR Results  (Last 48 hours) None Medications given in the ED- Medications  
traMADol (ULTRAM) tablet 50 mg (has no administration in time range) Medical Decision Making I am the first provider for this patient. I reviewed the vital signs, available nursing notes, past medical history, past surgical history, family history and social history. Vital Signs-Reviewed the patient's vital signs. Records Reviewed: Nursing Notes Procedures: 
Procedures ED Course:  
Initial assessment performed. The patients presenting problems have been discussed, and they are in agreement with the care plan formulated and outlined with them. I have encouraged them to ask questions as they arise throughout their visit. Discussion: 33 y.o. female complaining of left knee pain acute onset today. Patient with negative x-rays exam and history consistent with left knee strain plan for immobilizer crutches pain medicine ice orthopedic referral  
 
 
 
Diagnosis and Disposition DISCHARGE NOTE: 
The [facility]  results have been reviewed with her. She has been counseled regarding her diagnosis, treatment, and plan. She verbally conveys understanding and agreement of the signs, symptoms, diagnosis, treatment and prognosis and additionally agrees to follow up as discussed. She also agrees with the care-plan and conveys that all of her questions have been answered.   I have also provided discharge instructions for her that include: educational information regarding their diagnosis and treatment, and list of reasons why they would want to return to the ED prior to their follow-up appointment, should her condition change. She has been provided with education for proper emergency department utilization. CLINICAL IMPRESSION: 
 
1. Knee strain, left, initial encounter PLAN: 
1. D/C Home 2. There are no discharge medications for this patient. 3.  
Follow-up Information Follow up With Specialties Details Why Contact Info  
 your doctor  Schedule an appointment as soon as possible for a visit Celina Ramires MD Orthopedic Surgery   [address] [place] [address] [address] [address] [address] 
[phone] Please note that this dictation was completed with [employer], the computer voice recognition software. Quite often unanticipated grammatical, syntax, homophones, and other interpretive errors are inadvertently transcribed by the computer software. Please disregard these errors. Please excuse any errors that have escaped final proofreading.

## 2019-07-22 ENCOUNTER — HOSPITAL ENCOUNTER (EMERGENCY)
Age: 29
Discharge: HOME | End: 2019-07-23
Attending: EMERGENCY MEDICINE

## 2019-07-22 ENCOUNTER — APPOINTMENT (OUTPATIENT)
Dept: GENERAL RADIOLOGY | Age: 29
End: 2019-07-22
Attending: PHYSICIAN ASSISTANT

## 2019-07-22 DIAGNOSIS — E87.6 HYPOKALEMIA: ICD-10-CM

## 2019-07-22 DIAGNOSIS — J18.9 COMMUNITY ACQUIRED PNEUMONIA OF LEFT LOWER LOBE OF LUNG: Primary | ICD-10-CM

## 2019-07-22 LAB
ALBUMIN SERPL-MCNC: 3 G/DL (ref 3.4–5)
ALBUMIN/GLOB SERPL: 0.7 {RATIO} (ref 0.8–1.7)
ALP SERPL-CCNC: 112 U/L (ref 45–117)
ALT SERPL-CCNC: 31 U/L (ref 13–56)
ANION GAP SERPL CALC-SCNC: 10 MMOL/L (ref 3–18)
APPEARANCE UR: ABNORMAL
AST SERPL-CCNC: 18 U/L (ref 10–38)
ATRIAL RATE: 95 BPM
BACTERIA URNS QL MICRO: ABNORMAL /HPF
BASOPHILS # BLD: 0 K/UL (ref 0–0.1)
BASOPHILS NFR BLD: 0 % (ref 0–3)
BILIRUB SERPL-MCNC: 0.5 MG/DL (ref 0.2–1)
BILIRUB UR QL: ABNORMAL
BUN SERPL-MCNC: 8 MG/DL (ref 7–18)
BUN/CREAT SERPL: 9 (ref 12–20)
CALCIUM SERPL-MCNC: 8.5 MG/DL (ref 8.5–10.1)
CALCULATED P AXIS, ECG09: 72 DEGREES
CALCULATED R AXIS, ECG10: 51 DEGREES
CALCULATED T AXIS, ECG11: 39 DEGREES
CHLORIDE SERPL-SCNC: 102 MMOL/L (ref 100–111)
CK MB CFR SERPL CALC: NORMAL % (ref 0–4)
CK MB SERPL-MCNC: <1 NG/ML (ref 5–25)
CK SERPL-CCNC: 49 U/L (ref 26–192)
CO2 SERPL-SCNC: 29 MMOL/L (ref 21–32)
COLOR UR: ABNORMAL
CREAT SERPL-MCNC: 0.86 MG/DL (ref 0.6–1.3)
D DIMER PPP FEU-MCNC: 1.16 UG/ML(FEU)
DIAGNOSIS, 93000: NORMAL
DIFFERENTIAL METHOD BLD: ABNORMAL
EOSINOPHIL # BLD: 0 K/UL (ref 0–0.4)
EOSINOPHIL NFR BLD: 0 % (ref 0–5)
EPITH CASTS URNS QL MICRO: ABNORMAL /LPF (ref 0–5)
ERYTHROCYTE [DISTWIDTH] IN BLOOD BY AUTOMATED COUNT: 13.8 % (ref 11.6–14.5)
GLOBULIN SER CALC-MCNC: 4.4 G/DL (ref 2–4)
GLUCOSE SERPL-MCNC: 104 MG/DL (ref 74–99)
GLUCOSE UR STRIP.AUTO-MCNC: NEGATIVE MG/DL
HCG UR QL: NEGATIVE
HCT VFR BLD AUTO: 33.1 % (ref 35–45)
HGB BLD-MCNC: 10.7 G/DL (ref 12–16)
HGB UR QL STRIP: ABNORMAL
KETONES UR QL STRIP.AUTO: ABNORMAL MG/DL
LACTATE BLD-SCNC: 0.86 MMOL/L (ref 0.4–2)
LEUKOCYTE ESTERASE UR QL STRIP.AUTO: ABNORMAL
LYMPHOCYTES # BLD: 3.2 K/UL (ref 0.8–3.5)
LYMPHOCYTES NFR BLD: 18 % (ref 20–51)
MCH RBC QN AUTO: 26.9 PG (ref 24–34)
MCHC RBC AUTO-ENTMCNC: 32.3 G/DL (ref 31–37)
MCV RBC AUTO: 83.2 FL (ref 74–97)
MONOCYTES # BLD: 1.6 K/UL (ref 0–1)
MONOCYTES NFR BLD: 9 % (ref 2–9)
MUCOUS THREADS URNS QL MICRO: ABNORMAL /LPF
NEUTS BAND NFR BLD MANUAL: 1 % (ref 0–5)
NEUTS SEG # BLD: 12.6 K/UL (ref 1.8–8)
NEUTS SEG NFR BLD: 72 % (ref 42–75)
NITRITE UR QL STRIP.AUTO: NEGATIVE
P-R INTERVAL, ECG05: 146 MS
PH UR STRIP: 6 [PH] (ref 5–8)
PLATELET # BLD AUTO: 286 K/UL (ref 135–420)
PMV BLD AUTO: 10 FL (ref 9.2–11.8)
POTASSIUM SERPL-SCNC: 3.1 MMOL/L (ref 3.5–5.5)
PROT SERPL-MCNC: 7.4 G/DL (ref 6.4–8.2)
PROT UR STRIP-MCNC: 30 MG/DL
Q-T INTERVAL, ECG07: 350 MS
QRS DURATION, ECG06: 92 MS
QTC CALCULATION (BEZET), ECG08: 439 MS
RBC # BLD AUTO: 3.98 M/UL (ref 4.2–5.3)
RBC #/AREA URNS HPF: NEGATIVE /HPF (ref 0–5)
RBC MORPH BLD: ABNORMAL
SODIUM SERPL-SCNC: 141 MMOL/L (ref 136–145)
SP GR UR REFRACTOMETRY: 1.02 (ref 1–1.03)
TROPONIN I SERPL-MCNC: <0.02 NG/ML (ref 0–0.04)
UROBILINOGEN UR QL STRIP.AUTO: 1 EU/DL (ref 0.2–1)
VENTRICULAR RATE, ECG03: 95 BPM
WBC # BLD AUTO: 17.5 K/UL (ref 4.6–13.2)
WBC URNS QL MICRO: ABNORMAL /HPF (ref 0–5)

## 2019-07-22 PROCEDURE — 96375 TX/PRO/DX INJ NEW DRUG ADDON: CPT

## 2019-07-22 PROCEDURE — 83605 ASSAY OF LACTIC ACID: CPT

## 2019-07-22 PROCEDURE — 96361 HYDRATE IV INFUSION ADD-ON: CPT

## 2019-07-22 PROCEDURE — 81025 URINE PREGNANCY TEST: CPT

## 2019-07-22 PROCEDURE — 71101 X-RAY EXAM UNILAT RIBS/CHEST: CPT

## 2019-07-22 PROCEDURE — 87040 BLOOD CULTURE FOR BACTERIA: CPT

## 2019-07-22 PROCEDURE — 93005 ELECTROCARDIOGRAM TRACING: CPT

## 2019-07-22 PROCEDURE — 74011250637 HC RX REV CODE- 250/637: Performed by: PHYSICIAN ASSISTANT

## 2019-07-22 PROCEDURE — 82550 ASSAY OF CK (CPK): CPT

## 2019-07-22 PROCEDURE — 99285 EMERGENCY DEPT VISIT HI MDM: CPT

## 2019-07-22 PROCEDURE — 85025 COMPLETE CBC W/AUTO DIFF WBC: CPT

## 2019-07-22 PROCEDURE — 85379 FIBRIN DEGRADATION QUANT: CPT

## 2019-07-22 PROCEDURE — 74011000250 HC RX REV CODE- 250: Performed by: PHYSICIAN ASSISTANT

## 2019-07-22 PROCEDURE — 81001 URINALYSIS AUTO W/SCOPE: CPT

## 2019-07-22 PROCEDURE — 74011250636 HC RX REV CODE- 250/636: Performed by: PHYSICIAN ASSISTANT

## 2019-07-22 PROCEDURE — 80053 COMPREHEN METABOLIC PANEL: CPT

## 2019-07-22 RX ORDER — ONDANSETRON 4 MG/1
4 TABLET, ORALLY DISINTEGRATING ORAL
Status: COMPLETED | OUTPATIENT
Start: 2019-07-22 | End: 2019-07-22

## 2019-07-22 RX ORDER — SODIUM CHLORIDE 0.9 % (FLUSH) 0.9 %
5-10 SYRINGE (ML) INJECTION AS NEEDED
Status: DISCONTINUED | OUTPATIENT
Start: 2019-07-22 | End: 2019-07-23 | Stop reason: HOSPADM

## 2019-07-22 RX ORDER — AZITHROMYCIN 250 MG/1
500 TABLET, FILM COATED ORAL
Status: COMPLETED | OUTPATIENT
Start: 2019-07-22 | End: 2019-07-22

## 2019-07-22 RX ORDER — SULFAMETHOXAZOLE AND TRIMETHOPRIM 800; 160 MG/1; MG/1
1 TABLET ORAL 2 TIMES DAILY
Qty: 20 TAB | Refills: 0 | Status: SHIPPED | OUTPATIENT
Start: 2019-07-22 | End: 2019-07-22

## 2019-07-22 RX ORDER — ACETAMINOPHEN 325 MG/1
975 TABLET ORAL
Status: COMPLETED | OUTPATIENT
Start: 2019-07-22 | End: 2019-07-22

## 2019-07-22 RX ORDER — KETOROLAC TROMETHAMINE 30 MG/ML
30 INJECTION, SOLUTION INTRAMUSCULAR; INTRAVENOUS
Status: COMPLETED | OUTPATIENT
Start: 2019-07-22 | End: 2019-07-22

## 2019-07-22 RX ADMIN — AZITHROMYCIN 500 MG: 250 TABLET, FILM COATED ORAL at 23:10

## 2019-07-22 RX ADMIN — ONDANSETRON 4 MG: 4 TABLET, ORALLY DISINTEGRATING ORAL at 20:09

## 2019-07-22 RX ADMIN — KETOROLAC TROMETHAMINE 30 MG: 30 INJECTION, SOLUTION INTRAMUSCULAR at 23:06

## 2019-07-22 RX ADMIN — SODIUM CHLORIDE 1000 ML: 900 INJECTION, SOLUTION INTRAVENOUS at 23:05

## 2019-07-22 RX ADMIN — ACETAMINOPHEN 975 MG: 325 TABLET ORAL at 22:39

## 2019-07-22 RX ADMIN — CEFTRIAXONE 1 G: 1 INJECTION, POWDER, FOR SOLUTION INTRAMUSCULAR; INTRAVENOUS at 23:10

## 2019-07-22 NOTE — LETTER
[facility] [place] 
[facility] 
[address] [address] [phone] Work/School Note Date: 7/22/2019 To Whom It May concern: 
 
Carrington Schirmer was seen and treated today in the emergency room by the following provider(s): 
Physician Assistant: MARILIA Villar Ra. Carrington Schirmer should be excused from work 9/22/2019-9/26/2019. Sincerely, MARILIA Sparrow

## 2019-07-22 NOTE — ED TRIAGE NOTES
Patient reports to ED c/oleft rib pain onset Saturday. 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 Pt also c/o vomiting for 3 weeks, possibility of pregnancy per patient.

## 2019-07-23 ENCOUNTER — APPOINTMENT (OUTPATIENT)
Dept: CT IMAGING | Age: 29
End: 2019-07-23
Attending: PHYSICIAN ASSISTANT

## 2019-07-23 VITALS
BODY MASS INDEX: 32.2 KG/M2 | TEMPERATURE: 99 F | WEIGHT: 175 LBS | OXYGEN SATURATION: 100 % | RESPIRATION RATE: 20 BRPM | HEIGHT: 62 IN | SYSTOLIC BLOOD PRESSURE: 109 MMHG | DIASTOLIC BLOOD PRESSURE: 68 MMHG | HEART RATE: 90 BPM

## 2019-07-23 PROCEDURE — 74011250636 HC RX REV CODE- 250/636: Performed by: PHYSICIAN ASSISTANT

## 2019-07-23 PROCEDURE — 96365 THER/PROPH/DIAG IV INF INIT: CPT

## 2019-07-23 PROCEDURE — 71275 CT ANGIOGRAPHY CHEST: CPT

## 2019-07-23 PROCEDURE — 74011636320 HC RX REV CODE- 636/320: Performed by: EMERGENCY MEDICINE

## 2019-07-23 RX ORDER — HYDROCODONE BITARTRATE AND ACETAMINOPHEN 5; 325 MG/1; MG/1
1 TABLET ORAL
Qty: 10 TAB | Refills: 0 | Status: SHIPPED | OUTPATIENT
Start: 2019-07-23 | End: 2019-07-28

## 2019-07-23 RX ORDER — POTASSIUM CHLORIDE 7.45 MG/ML
10 INJECTION INTRAVENOUS
Status: COMPLETED | OUTPATIENT
Start: 2019-07-23 | End: 2019-07-23

## 2019-07-23 RX ORDER — DOXYCYCLINE HYCLATE 100 MG
100 TABLET ORAL 2 TIMES DAILY
Qty: 20 TAB | Refills: 0 | Status: SHIPPED | OUTPATIENT
Start: 2019-07-23 | End: 2019-08-02

## 2019-07-23 RX ADMIN — IOPAMIDOL 80 ML: 755 INJECTION, SOLUTION INTRAVENOUS at 00:47

## 2019-07-23 RX ADMIN — POTASSIUM CHLORIDE 10 MEQ: 10 INJECTION, SOLUTION INTRAVENOUS at 00:12

## 2019-07-23 RX ADMIN — SODIUM CHLORIDE 1000 ML: 900 INJECTION, SOLUTION INTRAVENOUS at 00:12

## 2019-07-23 NOTE — ED PROVIDER NOTES
EMERGENCY DEPARTMENT HISTORY AND PHYSICAL EXAM    Date: 7/22/2019  Patient Name: Danelle Meehan    History of Presenting Illness     Chief Complaint   Patient presents with    Rib Pain    Vomiting         History Provided By: Patient    8:22 PM  Danelle Meehan is a 30 y.o. female who presents to the emergency department C/O left lower chest wall pain onset 2 to 3 days ago. Patient states pain is waxing and waning but when severe causes her to be short of breath. Pain is worse with lying down and movement. +Mild intermittent dry cough. Patient states she had a sore throat last week but that resolved. Patient has Mirena IUD. Patient also adds that she has had issues with acid reflux, nausea and occasional vomiting over the past month pt denies no personal or family history of blood clots, cardiac disease at a young age, nausea, diarrhea, abdominal pain, recent long distance travel, leg pain or swelling injury or trauma, and any other sxs or complaints. PCP: Other, MD Adelaida        Past History     Past Medical History:  Past Medical History:   Diagnosis Date    Dislocated shoulder, left, initial encounter        Past Surgical History:  Past Surgical History:   Procedure Laterality Date    HX GYN      Mirena inserted Jan2019    HX ORTHOPAEDIC      right foot surgery       Family History:  History reviewed. No pertinent family history. Social History:  Social History     Tobacco Use    Smoking status: Current Every Day Smoker     Packs/day: 0.25    Smokeless tobacco: Never Used   Substance Use Topics    Alcohol use: Yes     Comment: occasional    Drug use: No       Allergies: Allergies   Allergen Reactions    Naproxen Other (comments)     Patient reports constipation         Review of Systems   Review of Systems   Constitutional: Positive for fever. HENT: Negative for sore throat. Respiratory: Positive for cough and shortness of breath. Cardiovascular: Positive for chest pain. Gastrointestinal: Positive for nausea and vomiting. Negative for abdominal pain. All other systems reviewed and are negative. Physical Exam     Vitals:    07/22/19 2330 07/23/19 0017 07/23/19 0030 07/23/19 0115   BP: 109/65  109/68    Pulse:   77 90   Resp:   22 20   Temp:  99 °F (37.2 °C)     SpO2: 100%  100% 100%   Weight:       Height:         Physical Exam  Vital signs and nursing notes reviewed. CONSTITUTIONAL: Alert. Well-appearing; well-nourished; appears uncomfortable but not in distress. HEAD: Normocephalic; atraumatic. EYES: PERRL; Conjunctiva clear. ENT: TM's normal. External ear normal. Normal nose; no rhinorrhea. Normal pharynx. Tonsils not enlarged without exudate. Moist mucus membranes. NECK: Supple; FROM without difficulty, non-tender; no cervical lymphadenopathy. CV: Normal S1, S2; no murmurs, rubs, or gallops. + Very tender to palpation left lower lateral chest wall; no crepitus, flail chest or rash. Rest of chest wall nontender. RESPIRATORY: Normal chest excursion with respiration; breath sounds clear and equal bilaterally; no wheezes, rhonchi, or rales. GI: Normal bowel sounds; non-distended; non-tender; no guarding or rigidity; no palpable organomegaly. No CVA tenderness. BACK:  No evidence of trauma or deformity. Non-tender to palpation. FROM without difficulty. EXT: Normal ROM in all four extremities; non-tender to palpation. No edema or calf pain  SKIN: Normal for age and race; warm; dry; good turgor; no apparent lesions or exudate. NEURO: A & O x3. PSYCH:  Mood and affect appropriate.            Diagnostic Study Results     Labs -     Recent Results (from the past 12 hour(s))   EKG, 12 LEAD, INITIAL    Collection Time: 07/22/19  8:12 PM   Result Value Ref Range    Ventricular Rate 95 BPM    Atrial Rate 95 BPM    P-R Interval 146 ms    QRS Duration 92 ms    Q-T Interval 350 ms    QTC Calculation (Bezet) 439 ms    Calculated P Axis 72 degrees Calculated R Axis 51 degrees    Calculated T Axis 39 degrees    Diagnosis       Normal sinus rhythm  Possible Left atrial enlargement  Borderline ECG  Confirmed by Ion Burnett MD, Foster Stanford ([id number]) on 7/22/2019 8:51:08 PM     HCG URINE, QL. - POC    Collection Time: 07/22/19  8:17 PM   Result Value Ref Range    Pregnancy test,urine (POC) NEGATIVE  NEG     POC LACTIC ACID    Collection Time: 07/22/19 10:56 PM   Result Value Ref Range    Lactic Acid (POC) 0.86 0.40 - 2.00 mmol/L   CULTURE, BLOOD    Collection Time: 07/22/19 11:00 PM   Result Value Ref Range    Special Requests: NO SPECIAL REQUESTS      Culture result: NO GROWTH AFTER 8 HOURS     METABOLIC PANEL, COMPREHENSIVE    Collection Time: 07/22/19 11:00 PM   Result Value Ref Range    Sodium 141 136 - 145 mmol/L    Potassium 3.1 (L) 3.5 - 5.5 mmol/L    Chloride 102 100 - 111 mmol/L    CO2 29 21 - 32 mmol/L    Anion gap 10 3.0 - 18 mmol/L    Glucose 104 (H) 74 - 99 mg/dL    BUN 8 7.0 - 18 MG/DL    Creatinine 0.86 0.6 - 1.3 MG/DL    BUN/Creatinine ratio 9 (L) 12 - 20      GFR est AA >60 >60 ml/min/1.73m2    GFR est non-AA >60 >60 ml/min/1.73m2    Calcium 8.5 8.5 - 10.1 MG/DL    Bilirubin, total 0.5 0.2 - 1.0 MG/DL    ALT (SGPT) 31 13 - 56 U/L    AST (SGOT) 18 10 - 38 U/L    Alk.  phosphatase 112 45 - 117 U/L    Protein, total 7.4 6.4 - 8.2 g/dL    Albumin 3.0 (L) 3.4 - 5.0 g/dL    Globulin 4.4 (H) 2.0 - 4.0 g/dL    A-G Ratio 0.7 (L) 0.8 - 1.7     CBC WITH AUTOMATED DIFF    Collection Time: 07/22/19 11:00 PM   Result Value Ref Range    WBC 17.5 (H) 4.6 - 13.2 K/uL    RBC 3.98 (L) 4.20 - 5.30 M/uL    HGB 10.7 (L) 12.0 - 16.0 g/dL    HCT 33.1 (L) 35.0 - 45.0 %    MCV 83.2 74.0 - 97.0 FL    MCH 26.9 24.0 - 34.0 PG    MCHC 32.3 31.0 - 37.0 g/dL    RDW 13.8 11.6 - 14.5 %    PLATELET 276 354 - 829 K/uL    MPV 10.0 9.2 - 11.8 FL    NEUTROPHILS 72 42 - 75 %    BAND NEUTROPHILS 1 0 - 5 %    LYMPHOCYTES 18 (L) 20 - 51 %    MONOCYTES 9 2 - 9 %    EOSINOPHILS 0 0 - 5 %    BASOPHILS 0 0 - 3 %    ABS. NEUTROPHILS 12.6 (H) 1.8 - 8.0 K/UL    ABS. LYMPHOCYTES 3.2 0.8 - 3.5 K/UL    ABS. MONOCYTES 1.6 (H) 0 - 1.0 K/UL    ABS. EOSINOPHILS 0.0 0.0 - 0.4 K/UL    ABS. BASOPHILS 0.0 0.0 - 0.1 K/UL    RBC COMMENTS NORMOCYTIC, NORMOCHROMIC      DF MANUAL     D DIMER    Collection Time: 07/22/19 11:00 PM   Result Value Ref Range    D DIMER 1.16 (H) <0.46 ug/ml(FEU)   CARDIAC PANEL,(CK, CKMB & TROPONIN)    Collection Time: 07/22/19 11:00 PM   Result Value Ref Range    CK 49 26 - 192 U/L    CK - MB <1.0 <3.6 ng/ml    CK-MB Index  0.0 - 4.0 %     CALCULATION NOT PERFORMED WHEN RESULT IS BELOW LINEAR LIMIT    Troponin-I, QT <0.02 0.0 - 0.045 NG/ML       Radiologic Studies -   X-ray left ribs show hazy infiltrate left lower lung  Pending review by radiologist  CTA CHEST W OR W WO CONT   Final Result   IMPRESSION:      1. No evidence of pulmonary embolism. 2.  Left upper lobe/lingular infiltrate and medial right lung base infiltrate   most consistent with pneumonia. XR RIBS LT W PA CXR MIN 3 V    (Results Pending)     CT Results  (Last 48 hours)               07/23/19 0057  CTA CHEST W OR W WO CONT Final result    Impression:  IMPRESSION:       1. No evidence of pulmonary embolism. 2.  Left upper lobe/lingular infiltrate and medial right lung base infiltrate   most consistent with pneumonia. Narrative:  EXAM: CTA chest       INDICATION: Pain. COMPARISON: None       TECHNIQUE: Axial CT imaging from the thoracic inlet through the diaphragm with   intravenous contrast. Coronal and sagittal MIP reformats were generated. Dose   reduction techniques used: automated exposure control, adjustment of the mAs   and/or kVp according to patient size, and iterative reconstruction techniques. _______________       FINDINGS:       EXAM QUALITY: Adequate. PULMONARY ARTERIES: No evidence of pulmonary embolism. MEDIASTINUM: Normal heart size. No evidence of right heart strain.  Aorta is unremarkable. No pericardial effusion. LUNGS: There is a left upper lobe/lingular infiltrate. There is a small medial   right lower lobe infiltrate. PLEURA: Normal.       AIRWAY: Normal.       LYMPH NODES: No enlarged nodes. UPPER ABDOMEN: Unremarkable. OTHER: No acute or aggressive osseous abnormalities identified. _______________               CXR Results  (Last 48 hours)    None          Medications given in the ED-  Medications   ondansetron (ZOFRAN ODT) tablet 4 mg (4 mg Oral Given 7/22/19 2009)   sodium chloride 0.9 % bolus infusion 1,000 mL (0 mL IntraVENous IV Completed 7/23/19 0005)   acetaminophen (TYLENOL) tablet 975 mg (975 mg Oral Given 7/22/19 2239)   ketorolac (TORADOL) injection 30 mg (30 mg IntraVENous Given 7/22/19 2306)   cefTRIAXone (ROCEPHIN) 1 g in sterile water (preservative free) 10 mL IV syringe (1 g IntraVENous Given 7/22/19 2310)   azithromycin (ZITHROMAX) tablet 500 mg (500 mg Oral Given 7/22/19 2310)   potassium chloride 10 mEq in 100 ml IVPB (0 mEq IntraVENous IV Completed 7/23/19 0112)   sodium chloride 0.9 % bolus infusion 1,000 mL (0 mL IntraVENous IV Completed 7/23/19 0112)   iopamidol (ISOVUE-370) 76 % injection 80 mL (80 mL IntraVENous Given 7/23/19 0047)         Medical Decision Making   I am the first provider for this patient. I reviewed the vital signs, available nursing notes, past medical history, past surgical history, family history and social history. Vital Signs-Reviewed the patient's vital signs. Records Reviewed: Nursing Notes      Procedures:  Procedures    ED Course:  8:22 PM   Initial assessment performed. The patients presenting problems have been discussed, and they are in agreement with the care plan formulated and outlined with them. I have encouraged them to ask questions as they arise throughout their visit. 9:15 PM progress note  Chest x-ray showed hazy infiltrate in the left lung.   While in ED patient spiked a fever to 101.7F, patient meets SIRS criteria, so will move to main ED, obtain labs, including d-dimer to rule out PE.    BEDSIDE TRANSFER OF CARE:  10:45 PM  Patient care will be transferred from MARILIA Alexander to [facility]. Discussed available diagnostic results and care plan at length at beside. Patient made aware of provider change. All questions answered. Patient voiced understanding. ED tech in room now drawing labs and obtaining IV access. Antibiotics ordered for presumed Community acquired pneumonia. Awaiting labs and final disposition. CTA shows no PE she does have pneumonia. She does have a leukocytosis however vitals are stable after fever control. She is otherwise young and healthy with an O2 sat of 100% on room air. Potassium replaced. Given first dose of IV antibiotics in the ED. Patient is stable for discharge home with strict return precautions    Diagnosis and Disposition       DISCHARGE NOTE:    The [facility]  results have been reviewed with her. She has been counseled regarding her diagnosis, treatment, and plan. She verbally conveys understanding and agreement of the signs, symptoms, diagnosis, treatment and prognosis and additionally agrees to follow up as discussed. She also agrees with the care-plan and conveys that all of her questions have been answered. I have also provided discharge instructions for her that include: educational information regarding their diagnosis and treatment, and list of reasons why they would want to return to the ED prior to their follow-up appointment, should her condition change. She has been provided with education for proper emergency department utilization. CLINICAL IMPRESSION:    1. Community acquired pneumonia of left lower lobe of lung ([address]    2. Hypokalemia        PLAN:  1. D/C Home  2.    Discharge Medication List as of 7/23/2019  2:11 AM      START taking these medications    Details   doxycycline (VIBRA-TABS) 100 mg tablet Take 1 Tab by mouth two (2) times a day for 10 days. , Print, Disp-20 Tab, R-0      HYDROcodone-acetaminophen (NORCO) 5-325 mg per tablet Take 1 Tab by mouth every six (6) hours as needed for Pain for up to 5 days. Max Daily Amount: 4 Tabs., Print, Disp-10 Tab, R-0           3. Follow-up Information     Follow up With Specialties Details Why Contact Info    [facility]   call for follow up and recheck  [address] [facility], [address] [place] [address] [address] [place] [address] [address]    [facility] Emergency Medicine  If symptoms worsen [address] [id number]  [phone]        _______________________________      Please note that this dictation was completed with [employer] the computer voice recognition software. Quite often unanticipated grammatical, syntax, homophones, and other interpretive errors are inadvertently transcribed by the computer software. Please disregard these errors. Please excuse any errors that have escaped final proofreading.

## 2019-07-23 NOTE — DISCHARGE INSTRUCTIONS
Hypokalemia: Care Instructions  Your Care Instructions    Hypokalemia (say \"kg-go-jxm-KETURAH-gregorio-uh\") is a low level of potassium. The heart, muscles, kidneys, and nervous system all need potassium to work well. This problem has many different causes. Kidney problems, diet, and medicines like diuretics and laxatives can cause it. So can vomiting or diarrhea. In some cases, cancer is the cause. Your doctor may do tests to find the cause of your low potassium levels. You may need medicines to bring your potassium levels back to normal. You may also need regular blood tests to check your potassium. If you have very low potassium, you may need intravenous (IV) medicines. You also may need tests to check the electrical activity of your heart. Heart problems caused by low potassium levels can be very serious. Follow-up care is a key part of your treatment and safety. Be sure to make and go to all appointments, and call your doctor if you are having problems. It's also a good idea to know your test results and keep a list of the medicines you take. How can you care for yourself at home? · If your doctor recommends it, eat foods that have a lot of potassium. These include fresh fruits, juices, and vegetables. They also include nuts, beans, and milk. · Be safe with medicines. If your doctor prescribes medicines or potassium supplements, take them exactly as directed. Call your doctor if you have any problems with your medicines. · Get your potassium levels tested as often as your doctor tells you. When should you call for help? Call 911 anytime you think you may need emergency care. For example, call if:    · You feel like your heart is missing beats. Heart problems caused by low potassium can cause death.     · You passed out (lost consciousness).     · You have a seizure.    Call your doctor now or seek immediate medical care if:    · You feel weak or unusually tired.     · You have severe arm or leg cramps.   · You have tingling or numbness.     · You feel sick to your stomach, or you vomit.     · You have belly cramps.     · You feel bloated or constipated.     · You have to urinate a lot.     · You feel very thirsty most of the time.     · You are dizzy or lightheaded, or you feel like you may faint.     · You feel depressed, or you lose touch with reality.    Watch closely for changes in your health, and be sure to contact your doctor if:    · You do not get better as expected. Where can you learn more? Go to http://elin-emely.info/. Enter G358 in the search box to learn more about \"Hypokalemia: Care Instructions. \"  Current as of: November 6, 2018  Content Version: 12.1  © 1833-2366 [employer]. Care instructions adapted under license by [email or web address] (which disclaims liability or warranty for this information). If you have questions about a medical condition or this instruction, always ask your healthcare professional. [address] any warranty or liability for your use of this information. Pneumonia: Care Instructions  Your Care Instructions    Pneumonia is an infection of the lungs. Most cases are caused by infections from bacteria or viruses. Pneumonia may be mild or very severe. If it is caused by bacteria, you will be treated with antibiotics. It may take a few weeks to a few months to recover fully from pneumonia, depending on how sick you were and whether your overall health is good. Follow-up care is a key part of your treatment and safety. Be sure to make and go to all appointments, and call your doctor if you are having problems. It's also a good idea to know your test results and keep a list of the medicines you take. How can you care for yourself at home? · Take your antibiotics exactly as directed. Do not stop taking the medicine just because you are feeling better.  You need to take the full course of antibiotics. · Take your medicines exactly as prescribed. Call your doctor if you think you are having a problem with your medicine. · Get plenty of rest and sleep. You may feel weak and tired for a while, but your energy level will improve with time. · To prevent dehydration, drink plenty of fluids, enough so that your urine is light yellow or clear like water. Choose water and other caffeine-free clear liquids until you feel better. If you have kidney, heart, or liver disease and have to limit fluids, talk with your doctor before you increase the amount of fluids you drink. · Take care of your cough so you can rest. A cough that brings up mucus from your lungs is common with pneumonia. It is one way your body gets rid of the infection. But if coughing keeps you from resting or causes severe fatigue and chest-wall pain, talk to your doctor. He or she may suggest that you take a medicine to reduce the cough. · Use a vaporizer or humidifier to add moisture to your bedroom. Follow the directions for cleaning the machine. · Do not smoke or allow others to smoke around you. Smoke will make your cough last longer. If you need help quitting, talk to your doctor about stop-smoking programs and medicines. These can increase your chances of quitting for good. · Take an over-the-counter pain medicine, such as acetaminophen (Tylenol), ibuprofen (Advil, Motrin), or naproxen (Aleve). Read and follow all instructions on the label. · Do not take two or more pain medicines at the same time unless the doctor told you to. Many pain medicines have acetaminophen, which is Tylenol. Too much acetaminophen (Tylenol) can be harmful. · If you were given a spirometer to measure how well your lungs are working, use it as instructed. This can help your doctor tell how your recovery is going.   · To prevent pneumonia in the future, talk to your doctor about getting a flu vaccine (once a year) and a pneumococcal vaccine (one time only for most people). When should you call for help? Call 911 anytime you think you may need emergency care. For example, call if:    · You have severe trouble breathing.    Call your doctor now or seek immediate medical care if:    · You cough up dark brown or bloody mucus (sputum).     · You have new or worse trouble breathing.     · You are dizzy or lightheaded, or you feel like you may faint.    Watch closely for changes in your health, and be sure to contact your doctor if:    · You have a new or higher fever.     · You are coughing more deeply or more often.     · You are not getting better after 2 days (48 hours).     · You do not get better as expected. Where can you learn more? Go to http://elin-emely.info/. Enter [id number] in the search box to learn more about \"Pneumonia: Care Instructions. \"  Current as of: September 5, 2018  Content Version: 12.1  © 9908-8776 [employer]. Care instructions adapted under license by [employer] (which disclaims liability or warranty for this information). If you have questions about a medical condition or this instruction, always ask your healthcare professional. [address] any warranty or liability for your use of this information.

## 2019-07-28 LAB
BACTERIA SPEC CULT: NORMAL
SERVICE CMNT-IMP: NORMAL

## 2021-03-28 ENCOUNTER — HOSPITAL ENCOUNTER (EMERGENCY)
Age: 31
Discharge: HOME | End: 2021-03-28
Attending: EMERGENCY MEDICINE
Payer: COMMERCIAL

## 2021-03-28 VITALS
SYSTOLIC BLOOD PRESSURE: 147 MMHG | HEIGHT: 62 IN | HEART RATE: 68 BPM | BODY MASS INDEX: 34.96 KG/M2 | WEIGHT: 190 LBS | DIASTOLIC BLOOD PRESSURE: 85 MMHG | TEMPERATURE: 97.7 F | OXYGEN SATURATION: 100 % | RESPIRATION RATE: 14 BRPM

## 2021-03-28 DIAGNOSIS — N93.8 DUB (DYSFUNCTIONAL UTERINE BLEEDING): Primary | ICD-10-CM

## 2021-03-28 LAB
ABO + RH BLD: NORMAL
ANION GAP SERPL CALC-SCNC: 5 MMOL/L (ref 3–18)
BASOPHILS # BLD: 0 K/UL (ref 0–0.1)
BASOPHILS NFR BLD: 0 % (ref 0–2)
BLOOD GROUP ANTIBODIES SERPL: NORMAL
BUN SERPL-MCNC: 13 MG/DL (ref 7–18)
BUN/CREAT SERPL: 14 (ref 12–20)
CALCIUM SERPL-MCNC: 8.4 MG/DL (ref 8.5–10.1)
CHLORIDE SERPL-SCNC: 112 MMOL/L (ref 100–111)
CO2 SERPL-SCNC: 25 MMOL/L (ref 21–32)
CREAT SERPL-MCNC: 0.92 MG/DL (ref 0.6–1.3)
DIFFERENTIAL METHOD BLD: ABNORMAL
EOSINOPHIL # BLD: 0.2 K/UL (ref 0–0.4)
EOSINOPHIL NFR BLD: 3 % (ref 0–5)
ERYTHROCYTE [DISTWIDTH] IN BLOOD BY AUTOMATED COUNT: 13.1 % (ref 11.6–14.5)
GLUCOSE SERPL-MCNC: 97 MG/DL (ref 74–99)
HCG UR QL: NEGATIVE
HCT VFR BLD AUTO: 37.9 % (ref 35–45)
HGB BLD-MCNC: 12.3 G/DL (ref 12–16)
LYMPHOCYTES # BLD: 2.4 K/UL (ref 0.9–3.6)
LYMPHOCYTES NFR BLD: 31 % (ref 21–52)
MCH RBC QN AUTO: 29.6 PG (ref 24–34)
MCHC RBC AUTO-ENTMCNC: 32.5 G/DL (ref 31–37)
MCV RBC AUTO: 91.3 FL (ref 74–97)
MONOCYTES # BLD: 0.6 K/UL (ref 0.05–1.2)
MONOCYTES NFR BLD: 8 % (ref 3–10)
NEUTS SEG # BLD: 4.6 K/UL (ref 1.8–8)
NEUTS SEG NFR BLD: 58 % (ref 40–73)
PLATELET # BLD AUTO: 259 K/UL (ref 135–420)
PMV BLD AUTO: 10.4 FL (ref 9.2–11.8)
POTASSIUM SERPL-SCNC: 3.8 MMOL/L (ref 3.5–5.5)
RBC # BLD AUTO: 4.15 M/UL (ref 4.2–5.3)
SODIUM SERPL-SCNC: 142 MMOL/L (ref 136–145)
SPECIMEN EXP DATE BLD: NORMAL
WBC # BLD AUTO: 7.9 K/UL (ref 4.6–13.2)

## 2021-03-28 PROCEDURE — 86900 BLOOD TYPING SEROLOGIC ABO: CPT

## 2021-03-28 PROCEDURE — 99284 EMERGENCY DEPT VISIT MOD MDM: CPT

## 2021-03-28 PROCEDURE — 85025 COMPLETE CBC W/AUTO DIFF WBC: CPT

## 2021-03-28 PROCEDURE — 80048 BASIC METABOLIC PNL TOTAL CA: CPT

## 2021-03-28 PROCEDURE — 81025 URINE PREGNANCY TEST: CPT

## 2021-03-28 NOTE — ED TRIAGE NOTES
Pt reports she has been having heavy vaginal bleeding x 2wks and reports she needs to change pads hourly. Pt reports she had merena removed 03/15/2021 and has been bleeding since.

## 2021-03-28 NOTE — ED PROVIDER NOTES
EMERGENCY DEPARTMENT HISTORY AND PHYSICAL EXAM    Date: 3/28/2021  Patient Name: Jacobo Webster    History of Presenting Illness     Chief Complaint   Patient presents with    Vaginal Bleeding         History Provided By: Patient    12:27 PM  Jacobo Webster is a 35 y.o. female with PMHX of anemia who presents to the emergency department C/O vaginal bleeding x approximately 2 weeks s/p removal of Mirena IUD. Started oral contraceptives immediately afterwards. Patient reports intermittent heavy bleeding, sometimes with clots daily but also has periods of no bleeding. She called her gynecologist 3 days ago and was told to monitor it. Had a large gush of bleeding this morning prompting her to come to ED. Patient reports history of anemia, has never required blood transfusion. Pt denies concern for STDs, new or different sexual partners, abdominal pain, syncope/near syncope, and any other sxs or complaints. PCP: Other, MD Adelaida        Past History     Past Medical History:  Past Medical History:   Diagnosis Date    Dislocated shoulder, left, initial encounter        Past Surgical History:  Past Surgical History:   Procedure Laterality Date    HX GYN      Mirena inserted Jan2019    HX ORTHOPAEDIC      right foot surgery       Family History:  History reviewed. No pertinent family history. Social History:  Social History     Tobacco Use    Smoking status: Former Smoker     Packs/day: 0.25    Smokeless tobacco: Never Used   Substance Use Topics    Alcohol use: Yes     Comment: occasional    Drug use: No       Allergies: Allergies   Allergen Reactions    Naproxen Other (comments)     Patient reports constipation         Review of Systems   Review of Systems   Constitutional: Negative for fever. Gastrointestinal: Negative for abdominal pain. Genitourinary: Positive for vaginal bleeding. Negative for pelvic pain. Neurological: Negative for syncope and light-headedness.    All other systems reviewed and are negative. Physical Exam     Vitals:    03/28/21 1222   BP: (!) 147/85   Pulse: 68   Resp: 14   Temp: 97.7 °F (36.5 °C)   SpO2: 100%   Weight: 86.2 kg (190 lb)   Height: 5' 2\" (1.575 m)     Physical Exam  Vital signs and nursing notes reviewed. CONSTITUTIONAL: Alert. Well-appearing; well-nourished; in no apparent distress. HEAD: Normocephalic; atraumatic. Venus [place]ven CV: Normal S1, S2; no murmurs, rubs, or gallops. No chest wall tenderness. RESPIRATORY: Normal chest excursion with respiration; breath sounds clear and equal bilaterally; no wheezes, rhonchi, or rales. GI: Normal bowel sounds; non-distended; non-tender; no guarding or rigidity; no palpable organomegaly. No CVA tenderness. PELVIC: Normal external genitalia, without rash or lesions. Speculum exam: Normal appearing cervix. Mild amount of blood in vault, no other discharge noted. Vaginal walls without lesion. SKIN: Normal for age and race; warm; dry; good turgor; no apparent lesions or exudate. NEURO: A & O x3. PSYCH:  Mood and affect appropriate. Diagnostic Study Results     Labs -     Recent Results (from the past 12 hour(s))   CBC WITH AUTOMATED DIFF    Collection Time: 03/28/21 12:40 PM   Result Value Ref Range    WBC 7.9 4.6 - 13.2 K/uL    RBC 4.15 (L) 4.20 - 5.30 M/uL    HGB 12.3 12.0 - 16.0 g/dL    HCT 37.9 35.0 - 45.0 %    MCV 91.3 74.0 - 97.0 FL    MCH 29.6 24.0 - 34.0 PG    MCHC 32.5 31.0 - 37.0 g/dL    RDW 13.1 11.6 - 14.5 %    PLATELET 544 242 - 898 K/uL    MPV 10.4 9.2 - 11.8 FL    NEUTROPHILS 58 40 - 73 %    LYMPHOCYTES 31 21 - 52 %    MONOCYTES 8 3 - 10 %    EOSINOPHILS 3 0 - 5 %    BASOPHILS 0 0 - 2 %    ABS. NEUTROPHILS 4.6 1.8 - 8.0 K/UL    ABS. LYMPHOCYTES 2.4 0.9 - 3.6 K/UL    ABS. MONOCYTES 0.6 0.05 - 1.2 K/UL    ABS. EOSINOPHILS 0.2 0.0 - 0.4 K/UL    ABS.  BASOPHILS 0.0 0.0 - 0.1 K/UL    DF AUTOMATED     METABOLIC PANEL, BASIC    Collection Time: 03/28/21 12:40 PM   Result Value Ref Range Sodium 142 136 - 145 mmol/L    Potassium 3.8 3.5 - 5.5 mmol/L    Chloride 112 (H) 100 - 111 mmol/L    CO2 25 21 - 32 mmol/L    Anion gap 5 3.0 - 18 mmol/L    Glucose 97 74 - 99 mg/dL    BUN 13 7.0 - 18 MG/DL    Creatinine 0.92 0.6 - 1.3 MG/DL    BUN/Creatinine ratio 14 12 - 20      GFR est AA >60 >60 ml/min/1.73m2    GFR est non-AA >60 >60 ml/min/1.73m2    Calcium 8.4 (L) 8.5 - 10.1 MG/DL   TYPE & SCREEN    Collection Time: 03/28/21 12:40 PM   Result Value Ref Range    Crossmatch Expiration 03/31/2021,2359     ABO/Rh(D) PENDING     Antibody screen PENDING    HCG URINE, QL. - POC    Collection Time: 03/28/21 12:50 PM   Result Value Ref Range    Pregnancy test,urine (POC) Negative NEG         Radiologic Studies -   No orders to display     CT Results  (Last 48 hours)    None        CXR Results  (Last 48 hours)    None          Medications given in the ED-  Medications - No data to display      Medical Decision Making   I am the first provider for this patient. I reviewed the vital signs, available nursing notes, past medical history, past surgical history, family history and social history. Vital Signs-Reviewed the patient's vital signs. Records Reviewed: Nursing Notes      Procedures:  Procedures    ED Course:  12:27 PM   Initial assessment performed. The patients presenting problems have been discussed, and they are in agreement with the care plan formulated and outlined with them. I have encouraged them to ask questions as they arise throughout their visit. Provider Notes (Medical Decision Making): Gumaro Buchanan is a 35 y.o. female presents with 2 weeks of intermittent heavy vaginal bleeding status post removal of Mirena IUD and starting OCPs. Her VSS. H&H normal. No significant bleeding on pelvic exam or signs of infection. Recommend that she continue OCPs, monitor bleeding and follow-up with her gynecologist return to ED if any worsening of symptoms or lightheadedness.     Diagnosis and Disposition       DISCHARGE NOTE:    [facility]  results have been reviewed with her. She has been counseled regarding her diagnosis, treatment, and plan. She verbally conveys understanding and agreement of the signs, symptoms, diagnosis, treatment and prognosis and additionally agrees to follow up as discussed. She also agrees with the care-plan and conveys that all of her questions have been answered. I have also provided discharge instructions for her that include: educational information regarding their diagnosis and treatment, and list of reasons why they would want to return to the ED prior to their follow-up appointment, should her condition change. She has been provided with education for proper emergency department utilization. CLINICAL IMPRESSION:    1. DUB (dysfunctional uterine bleeding)        PLAN:  1. D/C Home  2. There are no discharge medications for this patient. 3.   Follow-up Information     Follow up With Specialties Details Why Bj Silverman  Schedule an appointment as soon as possible for a visit   [address] [facility] [address]    [facility] EMERGENCY DE[facility] Emergency Medicine  As needed, If symptoms worsen [address] [id number] [phone]        _______________________________      Please note that this dictation was completed with [employer], the computer voice recognition software. Quite often unanticipated grammatical, syntax, homophones, and other interpretive errors are inadvertently transcribed by the computer software. Please disregard these errors. Please excuse any errors that have escaped final proofreading.